# Patient Record
Sex: MALE | Race: BLACK OR AFRICAN AMERICAN | NOT HISPANIC OR LATINO | URBAN - METROPOLITAN AREA
[De-identification: names, ages, dates, MRNs, and addresses within clinical notes are randomized per-mention and may not be internally consistent; named-entity substitution may affect disease eponyms.]

---

## 2020-06-04 ENCOUNTER — EMERGENCY (EMERGENCY)
Facility: HOSPITAL | Age: 40
LOS: 1 days | Discharge: HOME | End: 2020-06-04
Attending: EMERGENCY MEDICINE | Admitting: EMERGENCY MEDICINE
Payer: SELF-PAY

## 2020-06-04 VITALS
WEIGHT: 175.05 LBS | SYSTOLIC BLOOD PRESSURE: 133 MMHG | OXYGEN SATURATION: 97 % | HEIGHT: 72 IN | RESPIRATION RATE: 18 BRPM | HEART RATE: 64 BPM | TEMPERATURE: 98 F | DIASTOLIC BLOOD PRESSURE: 73 MMHG

## 2020-06-04 PROCEDURE — 26600 TREAT METACARPAL FRACTURE: CPT | Mod: F9

## 2020-06-04 PROCEDURE — 99284 EMERGENCY DEPT VISIT MOD MDM: CPT | Mod: 25

## 2020-06-04 PROCEDURE — 73130 X-RAY EXAM OF HAND: CPT

## 2020-06-04 PROCEDURE — 73130 X-RAY EXAM OF HAND: CPT | Mod: 26

## 2020-06-04 PROCEDURE — 73130 X-RAY EXAM OF HAND: CPT | Mod: 26,RT

## 2020-06-04 PROCEDURE — 99283 EMERGENCY DEPT VISIT LOW MDM: CPT | Mod: 25

## 2020-06-04 PROCEDURE — 29125 APPL SHORT ARM SPLINT STATIC: CPT

## 2020-06-04 RX ORDER — IBUPROFEN 200 MG
600 TABLET ORAL ONCE
Refills: 0 | Status: COMPLETED | OUTPATIENT
Start: 2020-06-04 | End: 2020-06-04

## 2020-06-04 RX ADMIN — Medication 600 MILLIGRAM(S): at 11:06
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
## 2020-06-04 NOTE — ED PROVIDER NOTE - CARE PROVIDER_API CALL
Tegan Nova  PLASTIC SURGERY  [address] [place] [id number]  Phone: [phone]  Fax: [phone]  Follow Up Time:

## 2020-06-04 NOTE — ED PROVIDER NOTE - PATIENT PORTAL LINK FT
You can access the FollowMyHealth Patient Portal offered by [facility] by registering at the following website: [email or web address]. By joining [employer]’s FollowMyHealth portal, you will also be able to view your health information using other applications (apps) compatible with our system.

## 2020-06-04 NOTE — ED PROVIDER NOTE - NSFOLLOWUPINSTRUCTIONS_ED_ALL_ED_FT
REST, ICE, ELEVATE, SPLINT MUST STAY ON UNTIL FOLLOW UP, USE SLING, IBUPROFEN AS NEEDED FOR PAIN  Boxer's Fracture  A boxer's fracture is a break (fracture) in the bone that connects your little finger to your wrist (fifth metacarpal). This type of fracture usually happens at the end of the bone, closest to the little finger. The knuckle is often pushed down by the impact.  What are the causes?  This condition may be caused by:  Hitting an object with a clenched fist.A hard, direct hit to the hand.An injury that crushes the hand.What increases the risk?  You are more likely to develop this condition if:  You are in a fistfight.You have certain bone diseases, such as osteoporosis.What are the signs or symptoms?  Symptoms of a boxer's fracture develop immediately after the injury. They may include:  Pain, which may get worse when your little finger is moved or your hand is touched.Bruising around the knuckle.Swelling of your hand.Your little finger being in an abnormal position.The knuckle on the finger looking sunken in.Not being able to move the finger.A shortened finger.How is this diagnosed?  This injury may be diagnosed based on:  Your symptoms. Your health care provider may ask about any recent hand injury.A physical exam.X-rays to confirm the diagnosis.How is this treated?  Treatment for this condition depends on how severe the injury is. Possible treatments include:  Closed reduction. This treatment may be used if your bone is stable. The health care provider uses pressure and rotation to move the bones back into place without cutting your skin open. You would need to wear a cast or splint to help keep your bones in place while they heal.Open reduction with internal fixation (ORIF). This may be needed if your fracture is far out of place or goes through the joint surface of the bone or through the skin. This treatment involves open surgery to move your bones back into the right position. Screws, wires, or plates may be used to make the fracture stable.You may also need to:  Wear a cast or a splint for several weeks. Have follow-up X-rays to make sure that the bone is healing well and staying in position. Have physical therapy after your cast or splint is removed. This will help you regain full movement (range of motion) and strength in your hand.Follow these instructions at home:  If you have a splint:   Wear the splint as told by your health care provider. Remove it only as told by your health care provider. Loosen the splint if your fingers tingle, become numb, or turn cold and blue.Keep the splint clean. If the splint is not waterproof:  Do not let it get wet.Cover it with a watertight covering when you take a bath or a shower.If you have a cast:   Do not stick anything inside the cast to scratch your skin. Doing that increases your risk of infection.Check the skin around the cast every day. Tell your health care provider about any concerns.You may put lotion on dry skin around the edges of the cast. Do not put lotion on the skin underneath the cast.Keep the cast clean. If the cast is not waterproof:   Do not let it get wet.Cover it with a watertight covering when you take a bath or a shower.Managing pain, stiffness, and swelling   If directed, put ice on the injured area.  If you have a removable splint, remove it as told by your health care provider.Put ice in a plastic bag.Place a towel between your skin and the bag or between your cast and the bag. Leave the ice on for 20 minutes, 2–3 times a day.Move your fingers often to avoid stiffness and to lessen swelling.Raise (elevate) the injured hand above the level of your heart while you are sitting or lying down.Driving   Do not drive or use heavy machinery while taking prescription pain medicine.Do not drive while wearing a cast or splint on your hand.General instructions   Do not put pressure on any part of the cast or splint until it is fully hardened. This may take several hours.Do not use any products that contain nicotine or tobacco, such as cigarettes and e-cigarettes. These can delay bone healing. If you need help quitting, ask your health care provider.Take over-the-counter and prescription medicines only as told by your health care provider.If you are taking prescription pain medicine, take actions to prevent or treat constipation. Your health care provider may recommend that you:  Drink enough fluid to keep your urine pale yellow. Eat foods that are high in fiber, such as fresh fruits and vegetables, whole grains, and beans. Limit foods that are high in fat and processed sugars, such as fried or sweet foods. Take an over-the-counter or prescription medicine for constipation.Return to your normal activities as told by your health care provider. Ask your health care provider what activities are safe for you.Keep all follow-up visits as told by your health care provider. This is important.Contact a health care provider if:  Your pain is getting worse.You have more redness, swelling, or pain in the injured area.You have a fever.Your cast or splint feels too tight or too loose.Your cast is coming apart.Get help right away if:  You have trouble breathing.You have severe pain.Your skin or nails on your injured hand turn blue or gray even after you loosen your splint.Your injured hand feels cold or becomes numb even after you loosen your splint.You develop a rash.Summary  A boxer's fracture is a break in the bone that connects your little finger to your wrist (fifth metacarpal).You may have an X-ray to confirm the diagnosis.Treatment depends on how severe your injury is, and it may include wearing a cast or splint
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
## 2020-06-04 NOTE — ED PROVIDER NOTE - MUSCULOSKELETAL, MLM
Spine appears normal, range of motion is not limited, no muscle or joint tenderness; R hand: + min swelling over dorsum of 5th metacarpal, no breaks in skin, no deformity, FROM, + light touch, muscle strength 5/5, good resistance, no U/M/R nerve deficits, cap refill < 2 sec, no tend over distal radius or ulna, radial pulse 2+

## 2020-06-08 DIAGNOSIS — M25.549 PAIN IN JOINTS OF UNSPECIFIED HAND: ICD-10-CM
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
## 2020-06-08 DIAGNOSIS — S62.336A DISPLACED FRACTURE OF NECK OF FIFTH METACARPAL BONE, RIGHT HAND, INITIAL ENCOUNTER FOR CLOSED FRACTURE: ICD-10-CM
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
## 2024-09-15 NOTE — ED PROVIDER NOTE - NS ED ATTENDING STATEMENT MOD
The patient is a 35y Male complaining of arm pain/injury.
I have personally performed a face to face diagnostic evaluation on this patient. I have reviewed the ACP note and agree with the history, exam and plan of care, except as noted.